# Patient Record
Sex: MALE | URBAN - NONMETROPOLITAN AREA
[De-identification: names, ages, dates, MRNs, and addresses within clinical notes are randomized per-mention and may not be internally consistent; named-entity substitution may affect disease eponyms.]

---

## 2024-09-30 ENCOUNTER — OFFICE VISIT (OUTPATIENT)
Dept: URBAN - NONMETROPOLITAN AREA CLINIC 13 | Facility: CLINIC | Age: 44
End: 2024-09-30
Payer: COMMERCIAL

## 2024-09-30 ENCOUNTER — LAB OUTSIDE AN ENCOUNTER (OUTPATIENT)
Dept: URBAN - NONMETROPOLITAN AREA CLINIC 13 | Facility: CLINIC | Age: 44
End: 2024-09-30

## 2024-09-30 ENCOUNTER — DASHBOARD ENCOUNTERS (OUTPATIENT)
Age: 44
End: 2024-09-30

## 2024-09-30 VITALS
SYSTOLIC BLOOD PRESSURE: 143 MMHG | BODY MASS INDEX: 25.16 KG/M2 | WEIGHT: 166 LBS | TEMPERATURE: 97.9 F | HEART RATE: 62 BPM | HEIGHT: 68 IN | DIASTOLIC BLOOD PRESSURE: 83 MMHG

## 2024-09-30 DIAGNOSIS — R13.19 ESOPHAGEAL DYSPHAGIA: ICD-10-CM

## 2024-09-30 DIAGNOSIS — Z12.11 COLON CANCER SCREENING: ICD-10-CM

## 2024-09-30 DIAGNOSIS — R13.10 ODYNOPHAGIA: ICD-10-CM

## 2024-09-30 PROBLEM — 30233002: Status: ACTIVE | Noted: 2024-09-30

## 2024-09-30 PROCEDURE — 99244 OFF/OP CNSLTJ NEW/EST MOD 40: CPT | Performed by: NURSE PRACTITIONER

## 2024-09-30 RX ORDER — OMEPRAZOLE 40 MG/1
TAKE 1 CAPSULE BY MOUTH IN THE MORNING 30 MINUTES BEFORE BREAKFAST ON AN EMPTY STOMACH CAPSULE, DELAYED RELEASE ORAL
Qty: 30 EACH | Refills: 0 | Status: ACTIVE | COMMUNITY

## 2024-09-30 RX ORDER — OMEPRAZOLE 40 MG/1
1 CAPSULE CAPSULE, DELAYED RELEASE ORAL TWICE DAILY
Qty: 60 CAPSULE | Refills: 6 | OUTPATIENT
Start: 2024-09-30

## 2024-09-30 NOTE — HPI-TODAY'S VISIT:
Patient is a pleasant 43-year-old male who we have been asked to consult on by Dr. Daphne Joel for esophageal dysphagia and to rule out EOE.  A copy of this note along with recommendations will be sent to referring provider.  States for the last 6 months or so, he has had some painful swallowing.  He thought this was his tonsils, which is why he was seeing Dr. Joel.  She did an exam and he did not have evidence of reflux.  He has also been experiencing some dysphagia to solids intermittently.  He does describe an episode which sounds like a food impaction.  This was with steak.  It did clear on its own.  He thinks the swallowing is been going on for the last several months as well.  Seems to be more cervical.  He is on omeprazole 40 mg and while he has noticed some improvement, no complete relief.  There is also an immunotherapy she has him on related to his chronic sinusitis that does have a increased risk of EOE. Sb

## 2024-11-12 ENCOUNTER — CLAIMS CREATED FROM THE CLAIM WINDOW (OUTPATIENT)
Dept: URBAN - NONMETROPOLITAN AREA SURGERY CENTER 1 | Facility: SURGERY CENTER | Age: 44
End: 2024-11-12
Payer: COMMERCIAL

## 2024-11-12 DIAGNOSIS — B37.81 CANDIDA: ICD-10-CM

## 2024-11-12 DIAGNOSIS — K22.89 OTHER SPECIFIED DISEASE OF ESOPHAGUS: ICD-10-CM

## 2024-11-12 PROCEDURE — 43239 EGD BIOPSY SINGLE/MULTIPLE: CPT | Performed by: INTERNAL MEDICINE

## 2024-11-12 PROCEDURE — 00731 ANES UPR GI NDSC PX NOS: CPT | Performed by: NURSE ANESTHETIST, CERTIFIED REGISTERED

## 2024-11-12 RX ORDER — OMEPRAZOLE 40 MG/1
TAKE 1 CAPSULE BY MOUTH IN THE MORNING 30 MINUTES BEFORE BREAKFAST ON AN EMPTY STOMACH CAPSULE, DELAYED RELEASE ORAL
Qty: 30 EACH | Refills: 0 | Status: ACTIVE | COMMUNITY

## 2024-11-12 RX ORDER — OMEPRAZOLE 40 MG/1
1 CAPSULE CAPSULE, DELAYED RELEASE ORAL TWICE DAILY
Qty: 60 CAPSULE | Refills: 6 | Status: ACTIVE | COMMUNITY
Start: 2024-09-30

## 2024-11-20 ENCOUNTER — TELEPHONE ENCOUNTER (OUTPATIENT)
Dept: URBAN - NONMETROPOLITAN AREA CLINIC 2 | Facility: CLINIC | Age: 44
End: 2024-11-20

## 2024-11-20 RX ORDER — FLUCONAZOLE 100 MG/1
1 TABLET TABLET ORAL DAILY
Qty: 21 TABLET | Refills: 0 | OUTPATIENT
Start: 2024-11-20 | End: 2024-12-11

## 2024-12-09 ENCOUNTER — OFFICE VISIT (OUTPATIENT)
Dept: URBAN - NONMETROPOLITAN AREA CLINIC 13 | Facility: CLINIC | Age: 44
End: 2024-12-09
Payer: COMMERCIAL

## 2024-12-09 VITALS
BODY MASS INDEX: 25.46 KG/M2 | HEIGHT: 68 IN | DIASTOLIC BLOOD PRESSURE: 101 MMHG | SYSTOLIC BLOOD PRESSURE: 153 MMHG | WEIGHT: 168 LBS | HEART RATE: 66 BPM

## 2024-12-09 DIAGNOSIS — R13.10 ODYNOPHAGIA: ICD-10-CM

## 2024-12-09 DIAGNOSIS — R13.19 ESOPHAGEAL DYSPHAGIA: ICD-10-CM

## 2024-12-09 DIAGNOSIS — Z12.11 COLON CANCER SCREENING: ICD-10-CM

## 2024-12-09 PROCEDURE — 99214 OFFICE O/P EST MOD 30 MIN: CPT | Performed by: NURSE PRACTITIONER

## 2024-12-09 RX ORDER — OMEPRAZOLE 40 MG/1
1 CAPSULE CAPSULE, DELAYED RELEASE ORAL TWICE DAILY
Qty: 60 CAPSULE | Refills: 6 | Status: ACTIVE | COMMUNITY
Start: 2024-09-30

## 2024-12-09 RX ORDER — OMEPRAZOLE 40 MG/1
1 CAPSULE CAPSULE, DELAYED RELEASE ORAL TWICE DAILY
Qty: 60 CAPSULE | Refills: 6 | OUTPATIENT

## 2024-12-09 RX ORDER — OMEPRAZOLE 40 MG/1
TAKE 1 CAPSULE BY MOUTH IN THE MORNING 30 MINUTES BEFORE BREAKFAST ON AN EMPTY STOMACH CAPSULE, DELAYED RELEASE ORAL
Qty: 30 EACH | Refills: 0 | Status: ACTIVE | COMMUNITY

## 2024-12-09 RX ORDER — FLUCONAZOLE 100 MG/1
1 TABLET TABLET ORAL DAILY
Qty: 21 TABLET | Refills: 0 | Status: ACTIVE | COMMUNITY
Start: 2024-11-20 | End: 2024-12-11

## 2024-12-09 NOTE — HPI-TODAY'S VISIT:
9/30/24 Patient is a pleasant 43-year-old male who we have been asked to consult on by Dr. Daphne Joel for esophageal dysphagia and to rule out EOE.  A copy of this note along with recommendations will be sent to referring provider.  States for the last 6 months or so, he has had some painful swallowing.  He thought this was his tonsils, which is why he was seeing Dr. Joel.  She did an exam and he did not have evidence of reflux.  He has also been experiencing some dysphagia to solids intermittently.  He does describe an episode which sounds like a food impaction.  This was with steak.  It did clear on its own.  He thinks the swallowing is been going on for the last several months as well.  Seems to be more cervical.  He is on omeprazole 40 mg and while he has noticed some improvement, no complete relief.  There is also an immunotherapy she has him on related to his chronic sinusitis that does have a increased risk of EOE. Otis 11/12/24 EGD esophagitis positive for candida 12/9/2024 Preet returns for follow-up of his endoscopy for odynophagia.  His true esophageal dysphagia has been improved, but he still having some cervical discomfort.  He has an upcoming appointment next week with Dr. Joel sb

## 2025-03-12 ENCOUNTER — OFFICE VISIT (OUTPATIENT)
Dept: URBAN - NONMETROPOLITAN AREA CLINIC 2 | Facility: CLINIC | Age: 45
End: 2025-03-12
Payer: COMMERCIAL

## 2025-03-12 VITALS — HEIGHT: 68 IN

## 2025-03-12 DIAGNOSIS — R13.19 ESOPHAGEAL DYSPHAGIA: ICD-10-CM

## 2025-03-12 DIAGNOSIS — Z12.11 COLON CANCER SCREENING: ICD-10-CM

## 2025-03-12 DIAGNOSIS — B37.81 CANDIDA ESOPHAGITIS: ICD-10-CM

## 2025-03-12 DIAGNOSIS — R13.10 ODYNOPHAGIA: ICD-10-CM

## 2025-03-12 PROCEDURE — 99214 OFFICE O/P EST MOD 30 MIN: CPT | Performed by: INTERNAL MEDICINE

## 2025-03-12 RX ORDER — OMEPRAZOLE 40 MG/1
1 CAPSULE CAPSULE, DELAYED RELEASE ORAL TWICE DAILY
Qty: 60 CAPSULE | Refills: 6 | Status: ACTIVE | COMMUNITY

## 2025-03-12 RX ORDER — OMEPRAZOLE 40 MG/1
1 CAPSULE CAPSULE, DELAYED RELEASE ORAL TWICE DAILY
Qty: 60 CAPSULE | Refills: 6 | OUTPATIENT

## 2025-03-12 RX ORDER — OMEPRAZOLE 40 MG/1
TAKE 1 CAPSULE BY MOUTH IN THE MORNING 30 MINUTES BEFORE BREAKFAST ON AN EMPTY STOMACH CAPSULE, DELAYED RELEASE ORAL
Qty: 30 EACH | Refills: 0 | Status: ACTIVE | COMMUNITY

## 2025-03-12 NOTE — HPI-TODAY'S VISIT:
9/30/24 Patient is a pleasant 43-year-old male who we have been asked to consult on by Dr. Daphne Joel for esophageal dysphagia and to rule out EOE.  A copy of this note along with recommendations will be sent to referring provider.  States for the last 6 months or so, he has had some painful swallowing.  He thought this was his tonsils, which is why he was seeing Dr. Joel.  She did an exam and he did not have evidence of reflux.  He has also been experiencing some dysphagia to solids intermittently.  He does describe an episode which sounds like a food impaction.  This was with steak.  It did clear on its own.  He thinks the swallowing is been going on for the last several months as well.  Seems to be more cervical.  He is on omeprazole 40 mg and while he has noticed some improvement, no complete relief.  There is also an immunotherapy she has him on related to his chronic sinusitis that does have a increased risk of EOE. Otis  11/12/24 EGD esophagitis positive for candida  12/9/2024 Preet returns for follow-up of his endoscopy for odynophagia.  His true esophageal dysphagia has been improved, but he still having some cervical discomfort.  He has an upcoming appointment next week with Dr. Marycruz cherry  3/12/25: Mr. Norton returns to GI clinic for follow-up of dysphagia / odynophagia symptoms.  He took diflucan as prescribed for candida esophagitis and feels better overall but still occasionally has slow transit of food through his esophagus.  No more severe cervical throat pain.  He has stopped his steroid swallow as Dr. Joel felt this was possibly contributing to the candida.

## 2025-07-02 ENCOUNTER — OFFICE VISIT (OUTPATIENT)
Dept: URBAN - NONMETROPOLITAN AREA CLINIC 2 | Facility: CLINIC | Age: 45
End: 2025-07-02